# Patient Record
Sex: MALE | Race: WHITE | Employment: OTHER | URBAN - METROPOLITAN AREA
[De-identification: names, ages, dates, MRNs, and addresses within clinical notes are randomized per-mention and may not be internally consistent; named-entity substitution may affect disease eponyms.]

---

## 2017-01-01 ENCOUNTER — PATIENT OUTREACH (OUTPATIENT)
Dept: INTERNAL MEDICINE CLINIC | Age: 72
End: 2017-01-01

## 2017-01-01 ENCOUNTER — PATIENT OUTREACH (OUTPATIENT)
Dept: CARDIOLOGY CLINIC | Age: 72
End: 2017-01-01

## 2017-01-01 ENCOUNTER — TELEPHONE (OUTPATIENT)
Dept: CARDIOLOGY CLINIC | Age: 72
End: 2017-01-01

## 2017-01-01 RX ORDER — CARVEDILOL 3.12 MG/1
3.12 TABLET ORAL 2 TIMES DAILY WITH MEALS
COMMUNITY

## 2017-01-01 RX ORDER — COLCHICINE 0.6 MG/1
0.6 TABLET ORAL 2 TIMES DAILY
COMMUNITY

## 2017-01-01 RX ORDER — BENAZEPRIL HYDROCHLORIDE 10 MG/1
10 TABLET ORAL DAILY
COMMUNITY

## 2017-01-01 RX ORDER — PANTOPRAZOLE SODIUM 40 MG/1
40 TABLET, DELAYED RELEASE ORAL DAILY
COMMUNITY

## 2017-01-01 RX ORDER — NITROGLYCERIN 0.4 MG/1
TABLET SUBLINGUAL
COMMUNITY

## 2017-01-01 RX ORDER — BENAZEPRIL HYDROCHLORIDE 20 MG/1
TABLET ORAL
Qty: 90 TAB | Refills: 3 | OUTPATIENT
Start: 2017-01-01

## 2017-01-01 RX ORDER — ATORVASTATIN CALCIUM 40 MG/1
40 TABLET, FILM COATED ORAL EVERY EVENING
COMMUNITY

## 2017-01-01 RX ORDER — PRASUGREL 10 MG/1
10 TABLET, FILM COATED ORAL DAILY
COMMUNITY

## 2017-04-03 NOTE — PROGRESS NOTES
This note will not be viewable in [address] Nurse navigator note - cardiology    Call to and reached Mr. Kellie Carson - who is post discharge from [facility] - He was transported by squad to the ER - with EKG showing ST elevation - he was taken emergently to the cath lab - and had cath with interventions- stenting - [facility] notes obtained and scanned in -   anastamosis site of LIMA - to LAD was stented/ and intermediate artery was stented -     As Mr. Kellie Carson has history of [facility] affiliation - NN call to check on him - his cath site is doing well without issue noted. He plans to [facility] - with NP appt set for 4/12/ and MD appt for 5/2 -   He has called their office today to report issues with diarrhea - discharged on Friday and mild diarrhea since. We discussed prevention of dehydration - and [facility] will address tx of choice. Note to Dr. Isra Dominguez about pt's follow up - we did review his medication changes - he is on Colchicine b/c of residual pain - post procedure - ? Pericarditis mentioned in notes - Colchicine probably short term -   He was started on Effient - and has this medication ; he is aware of the reason for the Effient - he had not been on Brillinta - of late - had taken it in remote past/ post stenting for 1 year. Plan: Follow up with [facility] - Dr. Ravi Cohen - who did his procedure.    Note to Dr. Isra Dominguez -      Jordyn Quevedo, RN , [facility], [address] [place] [address]   [address] [facility] [address]  [phone]

## 2017-04-04 NOTE — PROGRESS NOTES
This note will not be viewable in [address] Patient was listed on Passport report for admission to [facility] 3/28-3/31 for anterior wall MI and underwent cardiac cath with stenting. Cecille Piedra, cardiology NN, contacted patient yesterday for ANTOINE follow. Since the patient will be following up with Dr. Daja Santiago group, he did not schedule follow up with Dr. Moose Thompson. The patient has not been seen in this office since 1/2016, and that was for an acute visit. According to discharge summary,  NEW MEDICATIONS: Effient, Coreg, nitroglycerin, pantoprazole, colchicine. MEDICATION CHANGES (since 8/22/16): atorvastatin increased to 40 mg, benazepril decreased to 10 mg. DISCONTINUED MEDICATIONS: amlodipine    Updated Med Rec and discontinued the below medications, per verbal order from Dr. Leighann Declid. Medications Discontinued During This Encounter   Medication Reason    atorvastatin (LIPITOR) 10 mg tablet Dose Adjustment    benazepril (LOTENSIN) 20 mg tablet Dose Adjustment    amLODIPine (NORVASC) 10 mg tablet Discontinued at Discharge     Contacted the patient to determine if he is still a patient of this practice. Introduced self and NN role. He reports he is still a patient of Dr. Leighann Delcid and he will schedule an appointment after he has straightened things out with cardiology. Per Yoly's note, the patient has an appointment with [facility] - with NP appt set for 4/12 and MD appt for 5/2 with Dr. Jax Wells. The patient states that as long as he likes Dr. Daja Santiago team, he will continue with that practice rather than seeing Dr. Moose Thompson; however, if he does not like Dr. Daja Santiago team, he would then switch back to Dr. Moose Thmopson. Unable to review meds during call, but patient states he has no questions and has reviewed all changes. The patient requested that NN contact him in one month to schedule appointment with Dr. Leighann Delcid. Encouraged him to call if needed. No further questions or concerns at this time.

## 2017-05-03 NOTE — TELEPHONE ENCOUNTER
Patient states he was restarted back on amlodipine 10mg by Dr. Janet Keenan, cardiologist at [address] [place] [address]. He states he will follow-up with him for now and let us know who will be following him.

## 2017-05-04 NOTE — PROGRESS NOTES
Patient has completed 30 day transition of care. He declined to schedule [facility] appointment with Dr. Halima Crowley, but stated plan to f/u with [address] [place] [address] Cardiology on 4/12 and 5/2. He requested NN contact him in one month to schedule appointment with Dr. Halima Crowley. Attempted to contact patient. Unable to reach. LV requesting a return call to schedule appointment. Resolving ANTOINE episode.

## 2017-06-05 ENCOUNTER — PATIENT OUTREACH (OUTPATIENT)
Dept: CARDIOLOGY CLINIC | Age: 72
End: 2017-06-05

## 2017-06-05 NOTE — PROGRESS NOTES
Follow up phone call to Mr. Myron Pritchett- was trying to see how he did with follow up with provider post stent placement. I spoke with Ms. Myron Pritchett- she states that her  passed away from cardiac arrest on Mother's day. She said that they had been on vacation with family and had renewed their vows for their 5oth wedding anniversary when they returned to [place] and went out to country to stay at family home and he fell to the floor and turned purple. She relayed that he had been to his follow up with [facility]- Dr. Brianne Martinez and relayed that he was having some chest tightness but was told they would follow up in 6 months. He was \"given a clean bill of health\". Updated demarco and this will cancel tomorrow's appointment with Rosey Gutiérrez- she said he and she both loved Dr. Rosey Gutiérrez- she wanted me to let him know what happened. Offered condolence, support and assistance if needed.